# Patient Record
Sex: MALE | Race: WHITE | NOT HISPANIC OR LATINO | Employment: FULL TIME | URBAN - METROPOLITAN AREA
[De-identification: names, ages, dates, MRNs, and addresses within clinical notes are randomized per-mention and may not be internally consistent; named-entity substitution may affect disease eponyms.]

---

## 2022-06-12 ENCOUNTER — OFFICE VISIT (OUTPATIENT)
Dept: URGENT CARE | Facility: CLINIC | Age: 27
End: 2022-06-12

## 2022-06-12 VITALS
TEMPERATURE: 97.2 F | WEIGHT: 137.2 LBS | SYSTOLIC BLOOD PRESSURE: 134 MMHG | DIASTOLIC BLOOD PRESSURE: 86 MMHG | HEART RATE: 87 BPM | OXYGEN SATURATION: 98 % | BODY MASS INDEX: 18.58 KG/M2 | HEIGHT: 72 IN | RESPIRATION RATE: 14 BRPM

## 2022-06-12 DIAGNOSIS — Z91.018 FOOD ALLERGY: ICD-10-CM

## 2022-06-12 PROCEDURE — 99203 OFFICE O/P NEW LOW 30 MIN: CPT | Performed by: FAMILY MEDICINE

## 2022-06-12 NOTE — PROGRESS NOTES
"Chief Complaint   Patient presents with   • Allergic Reaction     X 1 day after eating  food.  He started to feel numbness all over and had trouble breathing after eating at  lunch [employer].  Hands are shakey.          HPI:      Pt developed shakiness and \"numbness\" all over body and some sob approximately 10 min after eating  food for lunch today.   He took 50mg benadryl and feels much better.    He now denies any chest pain, sob, sore throat, dizziness or rash.         He has no known food or drug allergies.       OBJECTIVE  /86   Pulse 87   Temp 36.2 °C (97.2 °F) (Temporal)   Resp 14   Ht 1.829 m (6')   Wt 62.2 kg (137 lb 3.2 oz)   SpO2 98%   HEENT - PERRLA, EOMI  Neuro - alert and oriented x3. CN 2-12 grossly intact.  Lungs - CTA. No wheezes, rhonchi or rales.  Heart - regular rate and rhythm without murmur.  Abdomen - soft and non-tender, bowel sounds active x4.  Musculoskeletal - No lower extremity edema noted.     SKIN - no rash      A/P:     1. Food allergy  Sx resolved after otc benadryl    Pt is asymptomatic at this time.     Advised to take another dose benadryl if sx return.   If sx are severe, pt was advised to go to ED         "

## 2024-04-01 ENCOUNTER — HOSPITAL ENCOUNTER (EMERGENCY)
Facility: MEDICAL CENTER | Age: 29
End: 2024-04-01
Attending: EMERGENCY MEDICINE
Payer: COMMERCIAL

## 2024-04-01 ENCOUNTER — APPOINTMENT (OUTPATIENT)
Dept: RADIOLOGY | Facility: MEDICAL CENTER | Age: 29
End: 2024-04-01
Attending: EMERGENCY MEDICINE
Payer: COMMERCIAL

## 2024-04-01 VITALS
SYSTOLIC BLOOD PRESSURE: 125 MMHG | BODY MASS INDEX: 23.89 KG/M2 | WEIGHT: 176.37 LBS | HEART RATE: 59 BPM | DIASTOLIC BLOOD PRESSURE: 79 MMHG | RESPIRATION RATE: 18 BRPM | TEMPERATURE: 97.9 F | OXYGEN SATURATION: 97 % | HEIGHT: 72 IN

## 2024-04-01 DIAGNOSIS — R07.9 CHEST PAIN, UNSPECIFIED TYPE: ICD-10-CM

## 2024-04-01 LAB
ANION GAP SERPL CALC-SCNC: 12 MMOL/L (ref 7–16)
BUN SERPL-MCNC: 15 MG/DL (ref 8–22)
CALCIUM SERPL-MCNC: 9.4 MG/DL (ref 8.5–10.5)
CHLORIDE SERPL-SCNC: 103 MMOL/L (ref 96–112)
CO2 SERPL-SCNC: 22 MMOL/L (ref 20–33)
CREAT SERPL-MCNC: 0.82 MG/DL (ref 0.5–1.4)
EKG IMPRESSION: NORMAL
GFR SERPLBLD CREATININE-BSD FMLA CKD-EPI: 121 ML/MIN/1.73 M 2
GLUCOSE SERPL-MCNC: 104 MG/DL (ref 65–99)
POTASSIUM SERPL-SCNC: 4.2 MMOL/L (ref 3.6–5.5)
SODIUM SERPL-SCNC: 137 MMOL/L (ref 135–145)
TROPONIN T SERPL-MCNC: <6 NG/L (ref 6–19)

## 2024-04-01 PROCEDURE — A9270 NON-COVERED ITEM OR SERVICE: HCPCS | Performed by: EMERGENCY MEDICINE

## 2024-04-01 PROCEDURE — 84484 ASSAY OF TROPONIN QUANT: CPT

## 2024-04-01 PROCEDURE — 71045 X-RAY EXAM CHEST 1 VIEW: CPT

## 2024-04-01 PROCEDURE — 80048 BASIC METABOLIC PNL TOTAL CA: CPT

## 2024-04-01 PROCEDURE — 700102 HCHG RX REV CODE 250 W/ 637 OVERRIDE(OP): Performed by: EMERGENCY MEDICINE

## 2024-04-01 PROCEDURE — 36415 COLL VENOUS BLD VENIPUNCTURE: CPT

## 2024-04-01 PROCEDURE — 93005 ELECTROCARDIOGRAM TRACING: CPT | Performed by: EMERGENCY MEDICINE

## 2024-04-01 PROCEDURE — 99283 EMERGENCY DEPT VISIT LOW MDM: CPT

## 2024-04-01 PROCEDURE — 93005 ELECTROCARDIOGRAM TRACING: CPT

## 2024-04-01 RX ADMIN — LIDOCAINE HYDROCHLORIDE 30 ML: 20 SOLUTION ORAL; TOPICAL at 09:45

## 2024-04-01 NOTE — ED NOTES
.  Patient to y61, connected to monitor. Denied any new complaints. Gurney in low position, side rail up for pt safety. Call light within reach. Up for ERP.

## 2024-04-01 NOTE — ED PROVIDER NOTES
ED Provider Note    CHIEF COMPLAINT  Chief Complaint   Patient presents with    Chest Pain     Centralized CP and SOB since early evening yesterday.   Non-radiating. Constant.     Shortness of Breath       EXTERNAL RECORDS REVIEWED  Outpatient Notes treatment center note from 1 year prior for allergic reaction to French food    HPI/ROS  LIMITATION TO HISTORY     OUTSIDE HISTORIAN(S):      Julio Nuno is a 28 y.o. male who presents to the emergency department chief complaint of chest pain.  Patient reports that he was very stressed out yesterday preparing for an exam.  States that he had taken an additional dose of Adderall over his usual dose to help him study.  Yesterday evening before going to bed he started having heavy sensation in his central chest.  States that it does get slightly worse in the standing position or with ambulation and somewhat better when he lays down flat.  He has had minor nauseousness no diaphoresis he reports no shortness of breath.  He does report that the pain slightly goes into his upper abdomen.  No other drug use.  Non-smoker no hormone use no long periods of immobilization no previous history of clot.    PAST MEDICAL HISTORY     None  SURGICAL HISTORY  patient denies any surgical history    FAMILY HISTORY  No family history on file.    SOCIAL HISTORY  Social History     Tobacco Use    Smoking status: Never    Smokeless tobacco: Current     Types: Snuff   Vaping Use    Vaping Use: Never used   Substance and Sexual Activity    Alcohol use: Yes    Drug use: Yes     Types: Inhaled    Sexual activity: Not on file       CURRENT MEDICATIONS  Home Medications       Reviewed by Jason Gomez R.N. (Registered Nurse) on 04/01/24 at 0815  Med List Status: Partial     Medication Last Dose Status   azithromycin (ZITHROMAX Z-JENI) 250 MG Tab  Active   guaifenesin-codeine (GUAIATUSSIN AC) Solution oral solution  Active                    ALLERGIES  No Known Allergies    PHYSICAL EXAM  VITAL SIGNS:  BP (!) 141/92   Pulse 96   Temp 35.9 °C (96.6 °F) (Temporal)   Resp 18   Ht 1.829 m (6')   Wt 80 kg (176 lb 5.9 oz)   SpO2 95%   BMI 23.92 kg/m²      Pulse ox interpretation: I interpret this pulse ox as normal.  Constitutional: Alert and oriented x 3, minimal distress  HEENT: Atraumatic normocephalic, pupils are equal round reactive to light extraocular movements are intact. The nares is clear, external ears are normal, mouth shows moist mucous membranes normal dentition for age  Neck: Supple, no JVD no tracheal deviation  Cardiovascular: Regular rate and rhythm no murmur rub or gallop 2+ pulses peripherally x4  Thorax & Lungs: No respiratory distress, no wheezes rales or rhonchi, No chest tenderness.   GI: Soft nontender nondistended positive bowel sounds, no peritoneal signs  Skin: Warm dry no acute rash or lesion  Musculoskeletal: Moving all extremities with full range and 5 of 5 strength no acute  deformity  Neurologic: Cranial nerves III through XII are grossly intact no sensory deficit no cerebellar dysfunction   Psychiatric: Appropriate affect for situation at this time      EKG/LABS  Results for orders placed or performed during the hospital encounter of 04/01/24   Basic Metabolic Panel (BMP)   Result Value Ref Range    Sodium 137 135 - 145 mmol/L    Potassium 4.2 3.6 - 5.5 mmol/L    Chloride 103 96 - 112 mmol/L    Co2 22 20 - 33 mmol/L    Glucose 104 (H) 65 - 99 mg/dL    Bun 15 8 - 22 mg/dL    Creatinine 0.82 0.50 - 1.40 mg/dL    Calcium 9.4 8.5 - 10.5 mg/dL    Anion Gap 12.0 7.0 - 16.0   Troponin - STAT Once   Result Value Ref Range    Troponin T <6 6 - 19 ng/L   ESTIMATED GFR   Result Value Ref Range    GFR (CKD-EPI) 121 >60 mL/min/1.73 m 2   EKG (NOW)   Result Value Ref Range    Report       [facility] Emergency Dept.    Test Date:  2024-04-01  Pt Name:    CARMELA SEN                 Department: ER  MRN:        [id number]                      Room:  Gender:     Male                          Technician: [id number]  :        1995                   Requested By:ER TRIAGE PROTOCOL  Order #:    [id number]                    Reading MD: MARK HINES MD    Measurements  Intervals                                Axis  Rate:       68                           P:          90  SD:         138                          QRS:        97  QRSD:       97                           T:          70  QT:         380  QTc:        405    Interpretive Statements    normal sinus rhythm at  68   , no ST elevation no ST depression no T-wave  inversion appropriate R-wave progression normal axis normal intervals no  other ischemic or arrhythmic features  Electronically Signed On 2024 09:09:57 PDT by MARK HINES MD       I have independently interpreted this EKG    RADIOLOGY  I have independently interpreted the diagnostic imaging associated with this visit and am waiting the final reading from the radiologist.   My preliminary interpretation is as follows:   No acute cardiopulmonary process    Radiologist interpretation:  DX-CHEST-PORTABLE (1 VIEW)   Final Result      1.  No acute cardiac or pulmonary abnormalities are identified.          COURSE & MEDICAL DECISION MAKING    ASSESSMENT, COURSE AND PLAN  Care Narrative: Pain improved with GI cocktail.  Her workup today is completely unremarkable symptoms been present for over 12 hours therefore no indication for repeat Trope or EKG.  Patient given instructions on symptomatic management.  He is to return should he have worsening chest pain shortness of breath any other acute symptom change or concern otherwise follow-up with primary care doctor at the next available time.    /79   Pulse (!) 59   Temp 36.6 °C (97.9 °F) (Temporal)   Resp 18   Ht 1.829 m (6')   Wt 80 kg (176 lb 5.9 oz)   SpO2 97%   BMI 23.92 kg/m²     [facility] [address]  [address]  [place] [address]  [phone]  Schedule an appointment as soon as  possible for a visit       [facility], Emergency Dept  [address] [facility] [id number] [phone]    in 12-24 hours if symptoms persist, immediately If symptoms worsen, or if you develop any other symptoms or concerns      Chest Pain: Heart score 1          ADDITIONAL PROBLEMS MANAGED      DISPOSITION AND DISCUSSIONS    I have discussed management of the patient with the following physicians and BENIGNO's:      Discussion of management with other [facility] or appropriate source(s):      Escalation of care considered, and ultimately not performed:acute inpatient care management, however at this time, the patient is most appropriate for outpatient management    Barriers to care at this time, including but not limited to: .     Decision tools and prescription drugs considered including, but not limited to: .  /79   Pulse (!) 59   Temp 36.6 °C (97.9 °F) (Temporal)   Resp 18   Ht 1.829 m (6')   Wt 80 kg (176 lb 5.9 oz)   SpO2 97%   BMI 23.92 kg/m²     [facility] [address]  [address] [facility], [address] [id number] [phone]  Schedule an appointment as soon as possible for a visit       [facility], Emergency Dept  [address] [facility] [address] [phone]    in 12-24 hours if symptoms persist, immediately If symptoms worsen, or if you develop any other symptoms or concerns      FINAL DIAGNOSIS  1. Chest pain, unspecified type           Electronically signed by: Emanuel Kaur M.D.

## 2024-04-01 NOTE — ED TRIAGE NOTES
Chief Complaint   Patient presents with    Chest Pain     Centralized CP and SOB since early evening yesterday.   Non-radiating. Constant.     Shortness of Breath     Ambulatory to triage for above.     BP (!) 141/92   Pulse 96   Temp 35.9 °C (96.6 °F) (Temporal)   Resp 18   Ht 1.829 m (6')   Wt 80 kg (176 lb 5.9 oz)   SpO2 95%   BMI 23.92 kg/m²

## 2024-07-02 ENCOUNTER — APPOINTMENT (OUTPATIENT)
Dept: RADIOLOGY | Facility: IMAGING CENTER | Age: 29
End: 2024-07-02
Attending: PHYSICIAN ASSISTANT
Payer: COMMERCIAL

## 2024-07-02 ENCOUNTER — OFFICE VISIT (OUTPATIENT)
Dept: URGENT CARE | Facility: CLINIC | Age: 29
End: 2024-07-02
Payer: COMMERCIAL

## 2024-07-02 VITALS
RESPIRATION RATE: 16 BRPM | OXYGEN SATURATION: 98 % | SYSTOLIC BLOOD PRESSURE: 138 MMHG | WEIGHT: 188 LBS | DIASTOLIC BLOOD PRESSURE: 86 MMHG | TEMPERATURE: 98.1 F | BODY MASS INDEX: 25.47 KG/M2 | HEIGHT: 72 IN | HEART RATE: 78 BPM

## 2024-07-02 DIAGNOSIS — S63.502A SPRAIN AND STRAIN OF LEFT WRIST: ICD-10-CM

## 2024-07-02 DIAGNOSIS — S66.912A SPRAIN AND STRAIN OF LEFT WRIST: ICD-10-CM

## 2024-07-02 PROCEDURE — 99213 OFFICE O/P EST LOW 20 MIN: CPT | Performed by: PHYSICIAN ASSISTANT

## 2024-07-02 PROCEDURE — 3075F SYST BP GE 130 - 139MM HG: CPT | Performed by: PHYSICIAN ASSISTANT

## 2024-07-02 PROCEDURE — 73110 X-RAY EXAM OF WRIST: CPT | Mod: TC,LT | Performed by: RADIOLOGY

## 2024-07-02 PROCEDURE — 3079F DIAST BP 80-89 MM HG: CPT | Performed by: PHYSICIAN ASSISTANT

## 2024-07-02 RX ORDER — DEXTROAMPHETAMINE SACCHARATE, AMPHETAMINE ASPARTATE MONOHYDRATE, DEXTROAMPHETAMINE SULFATE AND AMPHETAMINE SULFATE 5; 5; 5; 5 MG/1; MG/1; MG/1; MG/1
CAPSULE, EXTENDED RELEASE ORAL
COMMUNITY
Start: 2024-06-03

## 2024-07-02 ASSESSMENT — ENCOUNTER SYMPTOMS
BACK PAIN: 0
NECK PAIN: 0
LOSS OF CONSCIOUSNESS: 0
FEVER: 0
CHILLS: 0

## 2024-11-05 ENCOUNTER — APPOINTMENT (OUTPATIENT)
Dept: MEDICAL GROUP | Facility: CLINIC | Age: 29
End: 2024-11-05
Payer: COMMERCIAL

## 2024-11-13 ENCOUNTER — OFFICE VISIT (OUTPATIENT)
Dept: MEDICAL GROUP | Facility: CLINIC | Age: 29
End: 2024-11-13
Payer: COMMERCIAL

## 2024-11-13 VITALS
WEIGHT: 202.5 LBS | HEART RATE: 54 BPM | TEMPERATURE: 97.7 F | HEIGHT: 72 IN | OXYGEN SATURATION: 97 % | SYSTOLIC BLOOD PRESSURE: 152 MMHG | DIASTOLIC BLOOD PRESSURE: 88 MMHG | BODY MASS INDEX: 27.43 KG/M2

## 2024-11-13 DIAGNOSIS — Z23 NEED FOR VACCINATION: ICD-10-CM

## 2024-11-13 DIAGNOSIS — R03.0 ELEVATED BLOOD PRESSURE READING IN OFFICE WITHOUT DIAGNOSIS OF HYPERTENSION: ICD-10-CM

## 2024-11-13 DIAGNOSIS — J34.89 SINUS PRESSURE: Chronic | ICD-10-CM

## 2024-11-13 DIAGNOSIS — F90.9 ATTENTION DEFICIT HYPERACTIVITY DISORDER (ADHD), UNSPECIFIED ADHD TYPE: ICD-10-CM

## 2024-11-13 PROBLEM — I10 HYPERTENSION: Status: ACTIVE | Noted: 2024-11-13

## 2024-11-13 PROCEDURE — 3079F DIAST BP 80-89 MM HG: CPT | Mod: GC | Performed by: FAMILY MEDICINE

## 2024-11-13 PROCEDURE — 90656 IIV3 VACC NO PRSV 0.5 ML IM: CPT | Performed by: FAMILY MEDICINE

## 2024-11-13 PROCEDURE — 3077F SYST BP >= 140 MM HG: CPT | Mod: GC | Performed by: FAMILY MEDICINE

## 2024-11-13 PROCEDURE — 90471 IMMUNIZATION ADMIN: CPT | Performed by: FAMILY MEDICINE

## 2024-11-13 PROCEDURE — 99213 OFFICE O/P EST LOW 20 MIN: CPT | Mod: 25,GE | Performed by: FAMILY MEDICINE

## 2024-11-13 RX ORDER — DEXTROAMPHETAMINE SACCHARATE, AMPHETAMINE ASPARTATE MONOHYDRATE, DEXTROAMPHETAMINE SULFATE AND AMPHETAMINE SULFATE 5; 5; 5; 5 MG/1; MG/1; MG/1; MG/1
20 CAPSULE, EXTENDED RELEASE ORAL DAILY
Qty: 30 CAPSULE | Refills: 0 | Status: SHIPPED | OUTPATIENT
Start: 2024-11-13 | End: 2024-11-14 | Stop reason: SDUPTHER

## 2024-11-13 ASSESSMENT — PATIENT HEALTH QUESTIONNAIRE - PHQ9: CLINICAL INTERPRETATION OF PHQ2 SCORE: 0

## 2024-11-13 NOTE — ASSESSMENT & PLAN NOTE
Patient with chronic sinus pain/pressure that causes headaches for the past 8 years.  Does not have pain to palpation on exam.  Would like referral to ENT as he has tried multiple different conservative measures for many years without improvement.

## 2024-11-13 NOTE — PROGRESS NOTES
Subjective:     CC: Annual Physical    HISTORY OF THE PRESENT ILLNESS: Patient is a 29 y.o. male. This pleasant patient is here today to establish care and discuss a yearly physical for his ADHD medication renewal. Was diagnosed with ADHD at age 11, history of brother with it. Did not get medications until 3-4 years ago. Takes it 3-4 times weekly. Initially was on Ritalin but did not like side effects, has stable symptom management with Adderall XR. Helps to focus on school and work primarily.  Hard to focus on one task when not taking. Also has lack of motivation.     Sinus issues for the past 8 years or so. Has tried Flonase, multiple rinses, other meds with temporary relief. Gets pressure headaches from it. Would like referral to ENT for further management. Denies pain with sinus pressure, denies fevers, chills.      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 30 Capsule 0    azithromycin (ZITHROMAX Z-JENI) 250 MG Tab 2 tabs by mouth day 1, 1 tab by mouth days 2-5 (Patient not taking: Reported on 6/12/2022) 6 Tab 0    guaifenesin-codeine (GUAIATUSSIN AC) Solution oral solution Take 5 mL by mouth every four hours as needed for Cough. (Patient not taking: Reported on 6/12/2022) 120 mL 0     No current Epic-ordered facility-administered medications on file.       PAST MEDICAL HISTORY:  History reviewed. No pertinent past medical history.    PAST SURGICAL HISTORY:  History reviewed. No pertinent surgical history.    FAMILY HISTORY:  Family History   Problem Relation Age of Onset    Heart Disease Paternal Grandfather        SOCIAL HISTORY:   Pt lives here in [place]  Tobacco use: none  ETOH use: 2-4 beers weekly  Drug use: occasional marijuana use    DIET:   No orders of the defined  types were placed in this encounter.      ALLERGIES:  No Known Allergies  Patient reports having an anaphylaxis-like reaction to [place] food in which she had throat swelling and difficulty breathing was taken to the ER and received Benadryl.  Did not need epinephrine.  He is unsure of what was in the [place] food as he had had that previously without any issue.    MEDICATIONS:    Current Outpatient Medications:     amphetamine-dextroamphetamine (ADDERALL XR) 20 MG per XR capsule, Take 1 Capsule by mouth every day for 30 days., Disp: 30 Capsule, Rfl: 0    azithromycin (ZITHROMAX Z-JENI) 250 MG Tab, 2 tabs by mouth day 1, 1 tab by mouth days 2-5 (Patient not taking: Reported on 6/12/2022), Disp: 6 Tab, Rfl: 0    guaifenesin-codeine (GUAIATUSSIN AC) Solution oral solution, Take 5 mL by mouth every four hours as needed for Cough. (Patient not taking: Reported on 6/12/2022), Disp: 120 mL, Rfl: 0    ROS:   Gen: no fevers/chills, no changes in weight  Eyes: no changes in vision  ENT: no changes in hearing  Pulm: no sob, no cough  CV: no chest pain, no palpitations  GI: no nausea/vomiting, no diarrhea  MSk: no myalgias  Skin: no rash  Neuro: no headaches, no numbness/tingling      Objective:     Exam: BP (!) 152/88 (BP Location: Left arm, Patient Position: Sitting, BP Cuff Size: Adult)   Pulse (!) 54   Temp 36.5 °C (97.7 °F) (Temporal)   Ht 1.829 m (6')   Wt 91.9 kg (202 lb 8 oz)   SpO2 97%   BMI 27.46 kg/m²      General: Normal appearing. No distress.  HEENT: Normocephalic. Eyes conjunctiva clear lids without ptosis, pupils equal and reactive to light accommodation, ears normal shape and contour, unable to examine tympanic membranes or ear canal as well as nasal passageways due to lack of otoscope in office., oropharynx is without erythema, edema or exudates.   Neck: Supple without JVD or bruit. Thyroid is not enlarged.  Pulmonary: Clear to ausculation.  Normal effort. No rales, ronchi, or wheezing.  Cardiovascular:  Regular rate and rhythm without murmur. Carotid and radial pulses are intact and equal bilaterally.  Abdomen: Soft, nontender, nondistended. Normal bowel sounds. Liver and spleen are not palpable  Neurologic: Grossly nonfocal  Lymph: No cervical or supraclavicular lymph nodes are palpable  Skin: Warm and dry.  No obvious lesions.  Musculoskeletal: Normal gait. No obvious abnormalities.  No extremity cyanosis, clubbing, or edema.  Psych: Mood: stressed. Alert and oriented x3. Judgment and insight is normal.    Assessment & Plan:   29 y.o. male with the following -    Problem List Items Addressed This Visit       Attention deficit hyperactivity disorder (ADHD)     Patient with ADHD diagnosed at age 11.  Has been on Adderall 20 mg XR for the past 3 to 4 years.  Takes 3-4 times per week to help with concentration at school and work.  He is well-controlled on this regimen.  Physical exam unremarkable at this time.  Will continue to prescribe and have him follow-up for repeat exam in 1 year.         Relevant Medications    amphetamine-dextroamphetamine (ADDERALL XR) 20 MG per XR capsule    Need for vaccination     Flu shot administered today.         Relevant Orders    INFLUENZA VACCINE TRI INJ (PF) (Completed)    Sinus pressure (Chronic)     Patient with chronic sinus pain/pressure that causes headaches for the past 8 years.  Does not have pain to palpation on exam.  Would like referral to ENT as he has tried multiple different conservative measures for many years without improvement.           Relevant Orders    Referral to ENT    Elevated blood pressure reading in office without diagnosis of hypertension     Patient with elevated blood pressure 152/88 in clinic today.  Repeat blood pressure was 146/82.  He endorses significant stress as he has a job interview today.  However, we will have him report to [facility] over the next month and take 8 recorded blood pressures to send to me over MyChart and follow-up as  needed pending those readings.  We discussed and gave education regarding how to take his own BP. Referral also sent for [employer].          Relevant Orders    Referral to Genetic Research Studies       Return in about 1 year (around 11/13/2025) for Annual Physical.    Bertin Thomas M.D.

## 2024-11-13 NOTE — ASSESSMENT & PLAN NOTE
Patient with elevated blood pressure 152/88 in clinic today.  Repeat blood pressure was 146/82.  He endorses significant stress as he has a job interview today.  However, we will have him report to [facility] over the next month and take 8 recorded blood pressures to [facility] and follow-up as needed pending those readings.  We discussed and gave education regarding

## 2024-11-13 NOTE — ASSESSMENT & PLAN NOTE
Patient with ADHD diagnosed at age 11.  Has been on Adderall 20 mg XR for the past 3 to 4 years.  Takes 3-4 times per week to help with concentration at school and work.  He is well-controlled on this regimen.  Physical exam unremarkable at this time.  Will continue to prescribe and have him follow-up for repeat exam in 1 year.

## 2024-11-13 NOTE — ASSESSMENT & PLAN NOTE
Patient with elevated blood pressure 152/88 in clinic today.  Repeat blood pressure was 146/82.  He endorses significant stress as he has a job interview today.  However, we will have him report to [facility] over the next month and take 8 recorded blood pressures [facility] and follow-up as needed pending those readings.  We discussed and gave education regarding how to take his own BP. Referral also sent for [employer] [place] [employer].

## 2024-11-14 DIAGNOSIS — F90.9 ATTENTION DEFICIT HYPERACTIVITY DISORDER (ADHD), UNSPECIFIED ADHD TYPE: ICD-10-CM

## 2024-11-14 RX ORDER — DEXTROAMPHETAMINE SACCHARATE, AMPHETAMINE ASPARTATE MONOHYDRATE, DEXTROAMPHETAMINE SULFATE AND AMPHETAMINE SULFATE 5; 5; 5; 5 MG/1; MG/1; MG/1; MG/1
20 CAPSULE, EXTENDED RELEASE ORAL DAILY
Qty: 30 CAPSULE | Refills: 0 | Status: SHIPPED | OUTPATIENT
Start: 2024-11-14 | End: 2024-12-14

## 2024-11-14 NOTE — TELEPHONE ENCOUNTER
Received request via: Patient    Was the patient seen in the last year in this department? Yes    Does the patient have an active prescription (recently filled or refills available) for medication(s) requested? No    Pharmacy Name: [id number] - [place], [place] - [address]      Does the patient have long-term Plus and need 100-day supply? (This applies to ALL medications) Patient does not have SCP

## 2024-11-14 NOTE — TELEPHONE ENCOUNTER
Received request via: Patient    Was the patient seen in the last year in this department? Yes    Does the patient have an active prescription (recently filled or refills available) for medication(s) requested? No    Pharmacy Name: [facility]    Does the patient have skilled nursing Plus and need 100-day supply? (This applies to ALL medications) Patient does not have SCP

## 2025-06-13 ENCOUNTER — OFFICE VISIT (OUTPATIENT)
Dept: URGENT CARE | Facility: CLINIC | Age: 30
End: 2025-06-13
Payer: COMMERCIAL

## 2025-06-13 ENCOUNTER — APPOINTMENT (OUTPATIENT)
Dept: RADIOLOGY | Facility: IMAGING CENTER | Age: 30
End: 2025-06-13
Attending: NURSE PRACTITIONER
Payer: COMMERCIAL

## 2025-06-13 VITALS
HEART RATE: 108 BPM | BODY MASS INDEX: 27.28 KG/M2 | DIASTOLIC BLOOD PRESSURE: 82 MMHG | TEMPERATURE: 97.6 F | WEIGHT: 201.4 LBS | OXYGEN SATURATION: 97 % | SYSTOLIC BLOOD PRESSURE: 138 MMHG | HEIGHT: 72 IN | RESPIRATION RATE: 16 BRPM

## 2025-06-13 DIAGNOSIS — S49.91XA INJURY OF RIGHT SHOULDER, INITIAL ENCOUNTER: Primary | ICD-10-CM
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
## 2025-06-13 PROCEDURE — 99213 OFFICE O/P EST LOW 20 MIN: CPT | Performed by: NURSE PRACTITIONER

## 2025-06-13 PROCEDURE — 3075F SYST BP GE 130 - 139MM HG: CPT | Performed by: NURSE PRACTITIONER

## 2025-06-13 PROCEDURE — 3079F DIAST BP 80-89 MM HG: CPT | Performed by: NURSE PRACTITIONER

## 2025-06-13 PROCEDURE — 73030 X-RAY EXAM OF SHOULDER: CPT | Mod: TC,RT | Performed by: RADIOLOGY
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
## 2025-06-20 NOTE — Clinical Note
REFERRAL APPROVAL NOTICE         Sent on June 19, 2025                   Julio Nuno  [address]  [facility] [address]                   Dear Mr. Nuno,    After a careful review of the medical information and benefit coverage, [facility] has processed your referral. See below for additional details.    If applicable, you must be actively enrolled with your insurance for coverage of the authorized service. If you have any questions regarding your coverage, please contact your insurance directly.    REFERRAL INFORMATION   Referral #:  [id number]  Referred-To Department    Referred-By Provider:  Orthopedics    RIVAS Ventura   [facility] Main Totals (joint)      [address]  [address]  [facility] [id number] [phone] [address] [facility] [id number] [phone]    Referral Start Date:  06/13/2025  Referral End Date:   06/13/2026             SCHEDULING  If you do not already have an appointment, please call [phone] to make an appointment.     MORE INFORMATION  If you do not already have a Posterous account, sign up at: [email or web address]  You can access your medical information, make appointments, see lab results, billing information, and more.  If you have questions regarding this referral, please contact  the [facility] Referrals department at:             [phone]. Monday - Friday 8:00AM - 5:00PM.     Sincerely,    [facility]